# Patient Record
Sex: FEMALE | Race: WHITE | Employment: FULL TIME | URBAN - METROPOLITAN AREA
[De-identification: names, ages, dates, MRNs, and addresses within clinical notes are randomized per-mention and may not be internally consistent; named-entity substitution may affect disease eponyms.]

---

## 2020-02-11 ENCOUNTER — OFFICE VISIT (OUTPATIENT)
Dept: NEUROLOGY | Age: 75
End: 2020-02-11

## 2020-02-11 VITALS
HEIGHT: 65 IN | BODY MASS INDEX: 20.33 KG/M2 | RESPIRATION RATE: 16 BRPM | WEIGHT: 122 LBS | SYSTOLIC BLOOD PRESSURE: 130 MMHG | HEART RATE: 75 BPM | DIASTOLIC BLOOD PRESSURE: 75 MMHG | OXYGEN SATURATION: 97 %

## 2020-02-11 DIAGNOSIS — R51.9 OCCIPITAL PAIN: Primary | ICD-10-CM

## 2020-02-11 RX ORDER — LEVOTHYROXINE SODIUM 75 UG/1
TABLET ORAL
COMMUNITY

## 2020-02-11 RX ORDER — GABAPENTIN 100 MG/1
100 CAPSULE ORAL
Qty: 60 CAP | Refills: 1 | Status: SHIPPED | OUTPATIENT
Start: 2020-02-11 | End: 2021-09-15 | Stop reason: SDUPTHER

## 2020-02-11 NOTE — PROGRESS NOTES
Ms. Joselo Burks presents as a new patient for evaluation of pain in head. Depression screening done on patient.

## 2020-02-11 NOTE — PATIENT INSTRUCTIONS
PRESCRIPTION REFILL POLICY [facility] Statement to Patients April 1, 2014 In an effort to ensure the large volume of patient prescription refills is processed in the most efficient and expeditious manner, we are asking our patients to assist us by calling your Pharmacy for all prescription refills, this will include also your  Mail Order Pharmacy. The pharmacy will contact our office electronically to continue the refill process. Please do not wait until the last minute to call your pharmacy. We need at least 48 hours (2days) to fill prescriptions. We also encourage you to call your pharmacy before going to  your prescription to make sure it is ready. With regard to controlled substance prescription refill requests (narcotic refills) that need to be picked up at our office, we ask your cooperation by providing us with at least 72 hours (3days) notice that you will need a refill. We will not refill narcotic prescription refill requests after 4:00pm on any weekday, Monday through Thursday, or after 2:00pm on Fridays, or on the weekends. We encourage everyone to explore another way of getting your prescription refill request processed using [facility], our patient web portal through our electronic medical record system. [facility] is an efficient and effective way to communicate your medication request directly to the office and  downloadable as an omar on your smart phone . [facility] also features a review functionality that allows you to view your medication list as well as leave messages for your physician. Are you ready to get connected? If so please review the attatched instructions or speak to any of our staff to get you set up right away! Thank you so much for your cooperation. Should you have any questions please contact our Practice Administrator. The Physicians and Staff,  [facility]

## 2020-02-11 NOTE — PROGRESS NOTES
Chief Complaint   Patient presents with    Headache         HISTORY OF PRESENT ILLNESS  Carrie Da Silva is a 69 y.o. female who came in for an evaluation regarding occipital pain that she has been experiencing for the past month. She says that she experienced similar pain about a year and a half ago that lasted a few weeks and then subsided on its own. It is a sharp pain localized in the left occiput without any radiation. It comes on, will last a few minutes and then subside and sometimes comes on multiple times a day and will wake her up at night. Denies any speech problems, vision disturbance, focal motor or sensory deficits or imbalance. She thinks that the pain seems to be slowly subsiding on its own. History reviewed. No pertinent past medical history. Current Outpatient Medications   Medication Sig    levothyroxine (SYNTHROID) 75 mcg tablet Take  by mouth Daily (before breakfast).  gabapentin (NEURONTIN) 100 mg capsule Take 1 Cap by mouth two (2) times daily as needed for Pain. Max Daily Amount: 200 mg. No current facility-administered medications for this visit. Allergies   Allergen Reactions    Penicillins Unknown (comments)     Family History   Problem Relation Age of Onset    Heart Disease Father     Parkinsonism Father      Social History     Tobacco Use    Smoking status: Never Smoker    Smokeless tobacco: Never Used   Substance Use Topics    Alcohol use: Not Currently    Drug use: Not on file     History reviewed. No pertinent surgical history.       REVIEW OF SYSTEMS  Review of Systems - History obtained from the patient  Psychological ROS: negative  ENT ROS: negative  Hematological and Lymphatic ROS: negative  Endocrine ROS: negative  Respiratory ROS: no cough, shortness of breath, or wheezing  Cardiovascular ROS: no chest pain or dyspnea on exertion  Gastrointestinal ROS: no abdominal pain, change in bowel habits, or black or bloody stools  Genito-Urinary ROS: no dysuria, trouble voiding, or hematuria  Musculoskeletal ROS: negative  Dermatological ROS: negative      PHYSICAL EXAMINATION:    Visit Vitals  /75   Pulse 75   Resp 16   Ht 5' 5\" (1.651 m)   Wt 55.3 kg (122 lb)   SpO2 97%   BMI 20.30 kg/m²     General:  Well nourished and groomed individual in no acute distress. Neck: Supple, nontender, no bruits, no pain with resistance to active range of motion. Heart: Regular rate and rhythm. Normal S1S2. Lungs:  Equal chest expansion, no cough, no wheeze  Musculoskeletal:  Extremities revealed no edema and had full range of motion of joints. Psych:  Good mood and bright affect    NEUROLOGICAL EXAMINATION:     Mental Status:   Alert and oriented to person, place, and time with recent and remote memory intact. Attention span and concentration are normal. Speech is fluent. Cranial Nerves:    II, III, IV, VI:  Visual acuity grossly intact. Visual fields are normal.    Pupils are equal, round, and reactive to light and accommodation. Extra-ocular movements are full and fluid. Fundoscopic exam was benign, no ptosis or nystagmus. V-XII: Hearing is grossly intact. Facial features are symmetric, with normal sensation and strength. The palate rises symmetrically and the tongue protrudes midline. Sternocleidomastoids 5/5. Motor Examination: Normal tone, bulk, and strength. 5/5 muscle strength throughout. No cogwheel rigidity or clonus present. Sensory exam:  Normal throughout to pinprick, temperature, and vibration sense. Normal proprioception. Coordination:  Finger to nose and rapid arm movement testing was normal.   No resting or intention tremor    Gait and Station:  Steady while walking on toes, heels, and with tandem walking. Normal arm swing. No Rhomberg or pronator drift. No muscle wasting or fasiculations noted. Reflexes:  DTRs 2+ throughout. Toes downgoing. LABS / IMAGING      ASSESSMENT    ICD-10-CM ICD-9-CM    1. Occipital pain R51 784.0 gabapentin (NEURONTIN) 100 mg capsule       DISCUSSION  Ms. Opal Arellano has been having episodic occipital pain on the left side which I suspect to be a type of primary headache or occipital neuralgia.    Since it seems to be subsiding on its own and there are no other focal neurological deficits, will continue to observe this  If the pain becomes more intense or if she develops any symptoms, she will let me know and we will need to consider specialized brain imaging  She wishes to defer any specific pharmacologic therapy for now    Kenneth Gonzalez MD  Diplomate, American Board of Psychiatry & Neurology (Neurology)  Diplomate, American Board of Psychiatry & Neurology (Clinical Neurophysiology)  Diplomate, American Board of Electrodiagnostic Medicine    This note will not be viewable in [address]                  '

## 2021-08-24 ENCOUNTER — TELEPHONE (OUTPATIENT)
Dept: NEUROLOGY | Age: 76
End: 2021-08-24

## 2021-08-24 DIAGNOSIS — G44.52 NEW DAILY PERSISTENT HEADACHE: Primary | ICD-10-CM

## 2021-08-24 NOTE — TELEPHONE ENCOUNTER
Patient reported her headaches have returned. The back left side of head is affected. They have been daily for the past eight weeks. Please advise.

## 2021-08-24 NOTE — TELEPHONE ENCOUNTER
Per Dr. Rigoberto Hinojosa, I will put in an order for MRI.  If she is having occipital pain like before, we may schedule her for an occipital nerve block if she would like

## 2021-08-30 ENCOUNTER — HOSPITAL ENCOUNTER (OUTPATIENT)
Dept: MRI IMAGING | Age: 76
Discharge: HOME | End: 2021-08-30
Payer: MEDICARE

## 2021-08-30 DIAGNOSIS — G44.52 NEW DAILY PERSISTENT HEADACHE: ICD-10-CM

## 2021-08-30 PROCEDURE — 70551 MRI BRAIN STEM W/O DYE: CPT | Performed by: PSYCHIATRY & NEUROLOGY

## 2021-09-15 ENCOUNTER — OFFICE VISIT (OUTPATIENT)
Dept: NEUROLOGY | Age: 76
End: 2021-09-15
Payer: MEDICARE

## 2021-09-15 VITALS
SYSTOLIC BLOOD PRESSURE: 132 MMHG | HEIGHT: 65 IN | DIASTOLIC BLOOD PRESSURE: 78 MMHG | HEART RATE: 68 BPM | BODY MASS INDEX: 20.16 KG/M2 | OXYGEN SATURATION: 98 % | WEIGHT: 121 LBS

## 2021-09-15 DIAGNOSIS — R51.9 OCCIPITAL PAIN: ICD-10-CM

## 2021-09-15 DIAGNOSIS — M54.81 OCCIPITAL NEURALGIA OF LEFT SIDE: Primary | ICD-10-CM

## 2021-09-15 PROCEDURE — 99214 OFFICE O/P EST MOD 30 MIN: CPT | Performed by: PSYCHIATRY & NEUROLOGY

## 2021-09-15 PROCEDURE — 1101F PT FALLS ASSESS-DOCD LE1/YR: CPT | Performed by: PSYCHIATRY & NEUROLOGY

## 2021-09-15 PROCEDURE — G8427 DOCREV CUR MEDS BY ELIG CLIN: HCPCS | Performed by: PSYCHIATRY & NEUROLOGY

## 2021-09-15 PROCEDURE — G8420 CALC BMI NORM PARAMETERS: HCPCS | Performed by: PSYCHIATRY & NEUROLOGY

## 2021-09-15 PROCEDURE — 1090F PRES/ABSN URINE INCON ASSESS: CPT | Performed by: PSYCHIATRY & NEUROLOGY

## 2021-09-15 PROCEDURE — G8510 SCR DEP NEG, NO PLAN REQD: HCPCS | Performed by: PSYCHIATRY & NEUROLOGY

## 2021-09-15 PROCEDURE — 3017F COLORECTAL CA SCREEN DOC REV: CPT | Performed by: PSYCHIATRY & NEUROLOGY

## 2021-09-15 PROCEDURE — G8536 NO DOC ELDER MAL SCRN: HCPCS | Performed by: PSYCHIATRY & NEUROLOGY

## 2021-09-15 PROCEDURE — G8400 PT W/DXA NO RESULTS DOC: HCPCS | Performed by: PSYCHIATRY & NEUROLOGY

## 2021-09-15 RX ORDER — GABAPENTIN 100 MG/1
100 CAPSULE ORAL
Qty: 60 CAPSULE | Refills: 1 | Status: SHIPPED | OUTPATIENT
Start: 2021-09-15

## 2021-09-15 RX ORDER — ACYCLOVIR 400 MG/1
TABLET ORAL
COMMUNITY

## 2021-09-15 NOTE — PROGRESS NOTES
Chief Complaint   Patient presents with    Follow-up     Occipital pain 'it comes and goes.  I am here to discuss MRI results\"     Visit Vitals  /78 (BP 1 Location: Right upper arm, BP Patient Position: Sitting)   Pulse 68   Ht 5' 5\" (1.651 m)   Wt 121 lb (54.9 kg)   SpO2 98%   BMI 20.14 kg/m²

## 2021-09-15 NOTE — PROGRESS NOTES
Chief Complaint   Patient presents with    Follow-up     Occipital pain 'it comes and goes. I am here to discuss MRI results\"       HISTORY OF PRESENT ILLNESS  Rosy Joy came back for follow-up. States that she was doing quite well as far as left-sided occipital pain is concerned but recently she started to have it again. She describes this as sharp twinge or if somebody is tightening and not around the left suboccipital area that will last a few seconds and then subside. At times it will go on for a day or so. Today she did not have any pain. She did get an MRI scan of the brain which was normal.  Last year she was given gabapentin which she took for a few days and it helped. No past medical history on file. Current Outpatient Medications   Medication Sig    acyclovir (ZOVIRAX) 400 mg tablet acyclovir 400 mg tablet   Take 1 tablet every 8 hours by oral route.  gabapentin (NEURONTIN) 100 mg capsule Take 1 Capsule by mouth two (2) times daily as needed for Pain. Max Daily Amount: 200 mg.  levothyroxine (SYNTHROID) 75 mcg tablet Take  by mouth Daily (before breakfast). No current facility-administered medications for this visit. Allergies   Allergen Reactions    Penicillins Unknown (comments)       PHYSICAL EXAMINATION:    Visit Vitals  /78 (BP 1 Location: Right upper arm, BP Patient Position: Sitting)   Pulse 68   Ht 5' 5\" (1.651 m)   Wt 121 lb (54.9 kg)   SpO2 98%   BMI 20.14 kg/m²       NEUROLOGICAL EXAMINATION:     Mental Status:   Alert and oriented to person, place, and time. Attention span and concentration are normal. Speech is fluent . Cranial Nerves:    II, III, IV, VI:  Visual acuity grossly intact. Visual fields are normal.    Pupils are equal, round, and reactive. Extra-ocular movements are full and fluid. V-XII: Hearing is grossly intact. Facial features are symmetric, with normal sensation and strength.   The palate rises symmetrically and the tongue protrudes midline. Motor Examination: Normal tone, bulk, and strength. Sensory exam:  Normal throughout     Coordination:  Finger to nose and rapid arm movement testing was normal.   No resting or intention tremor    Gait and Station:  Steady. Normal arm swing. No muscle wasting or fasiculations noted. LABS / IMAGING  MRI Results (most recent):  Results from Hospital Encounter encounter on 08/30/21    MRI BRAIN WO CONT    Narrative  CLINICAL HISTORY: New onset daily persistent headache. INDICATION: New onset of persistent headache. COMPARISON: NONE    TECHNIQUE: MR examination of the brain includes axial and sagittal T1, coronal  T2, axial T2, axial FLAIR, axial gradient echo, axial DWI. CONTRAST: None    FINDINGS:  There is no intracranial mass, hemorrhage or evidence of acute infarction. There is degenerative change of the cervical spine with canal stenosis at C4-5  and minimal anterolisthesis of C3 on C4. IACs are symmetric in size. The left  vertebral artery is dominant. The brain architecture is normal. There is no evidence of midline shift or  mass-effect. There are no extra-axial fluid collections. There is no Chiari or  syrinx. The pituitary and infundibulum are grossly unremarkable. There is no  skull base mass. Cerebellopontine angles are grossly unremarkable. The major  intracranial vascular flow-voids are unremarkable. The cavernous sinuses are  symmetric. Optic chiasm and infundibulum grossly unremarkable. Orbits are  grossly symmetric. Dural venous sinuses are grossly patent. The mastoid air cells are well pneumatized and clear. Impression  Normal MRI of the brain. There is no intracranial mass, hemorrhage or evidence of acute infarction. No acute intracranial process is demonstrated. MRI images were reviewed    ASSESSMENT    ICD-10-CM ICD-9-CM    1. Occipital neuralgia of left side  M54.81 723.8 gabapentin (NEURONTIN) 100 mg capsule   2.  Occipital pain  R51.9 784.0        DISCUSSION  Ms. Abby Disla has been having episodic occipital pain on the left side for over a year now and I suspect that this is likely occipital neuralgia.    Since it seems to be subsiding on its own and there are no other focal neurological deficits, will continue to observe this  She was reassured regarding negative brain MRI  If the pain becomes more intense or if she develops any symptoms, she will let me know and we may consider trial of occipital nerve block   May use gabapentin as needed and prescription was sent    Eliel Laughlin MD  Diplomate, American Board of Psychiatry & Neurology (Neurology)  Diplomate, American Board of Psychiatry & Neurology (Clinical Neurophysiology)  Diplomate, American Board of Electrodiagnostic Medicine                      '

## 2023-05-26 RX ORDER — LEVOTHYROXINE SODIUM 0.07 MG/1
TABLET ORAL
COMMUNITY

## 2023-05-26 RX ORDER — ACYCLOVIR 400 MG/1
TABLET ORAL
COMMUNITY

## 2023-05-26 RX ORDER — GABAPENTIN 100 MG/1
100 CAPSULE ORAL 2 TIMES DAILY PRN
COMMUNITY
Start: 2021-09-15

## 2024-09-18 ENCOUNTER — HOSPITAL ENCOUNTER (OUTPATIENT)
Facility: HOSPITAL | Age: 79
Discharge: HOME | End: 2024-09-21
Payer: MEDICARE

## 2024-09-18 DIAGNOSIS — R74.8 ELEVATED SERUM GGT LEVEL: ICD-10-CM

## 2024-09-18 DIAGNOSIS — K76.89 HEPATIC CYST: ICD-10-CM

## 2024-09-18 DIAGNOSIS — R74.8 ELEVATED SERUM ALKALINE PHOSPHATASE LEVEL: ICD-10-CM

## 2024-09-18 LAB — CREAT BLD-MCNC: 0.7 MG/DL (ref 0.6–1.3)

## 2024-09-18 PROCEDURE — 82565 ASSAY OF CREATININE: CPT

## 2024-09-18 PROCEDURE — 74170 CT ABD WO CNTRST FLWD CNTRST: CPT

## 2024-09-18 PROCEDURE — 6360000004 HC RX CONTRAST MEDICATION

## 2024-09-18 RX ORDER — IOPAMIDOL 755 MG/ML
100 INJECTION, SOLUTION INTRAVASCULAR
Status: COMPLETED | OUTPATIENT
Start: 2024-09-18 | End: 2024-09-18

## 2024-09-18 RX ADMIN — IOPAMIDOL 100 ML: 755 INJECTION, SOLUTION INTRAVENOUS at 09:19
